# Patient Record
Sex: FEMALE
[De-identification: names, ages, dates, MRNs, and addresses within clinical notes are randomized per-mention and may not be internally consistent; named-entity substitution may affect disease eponyms.]

---

## 2020-07-13 PROBLEM — Z00.00 ENCOUNTER FOR PREVENTIVE HEALTH EXAMINATION: Status: ACTIVE | Noted: 2020-07-13

## 2020-07-21 ENCOUNTER — APPOINTMENT (OUTPATIENT)
Dept: NEUROLOGY | Facility: CLINIC | Age: 60
End: 2020-07-21
Payer: COMMERCIAL

## 2020-07-21 VITALS
DIASTOLIC BLOOD PRESSURE: 79 MMHG | BODY MASS INDEX: 26.05 KG/M2 | HEIGHT: 63 IN | TEMPERATURE: 97.7 F | HEART RATE: 107 BPM | OXYGEN SATURATION: 98 % | WEIGHT: 147 LBS | SYSTOLIC BLOOD PRESSURE: 126 MMHG

## 2020-07-21 DIAGNOSIS — I63.19: ICD-10-CM

## 2020-07-21 PROCEDURE — 99204 OFFICE O/P NEW MOD 45 MIN: CPT

## 2020-07-21 RX ORDER — ATORVASTATIN CALCIUM 40 MG/1
40 TABLET, FILM COATED ORAL
Qty: 30 | Refills: 5 | Status: ACTIVE | COMMUNITY
Start: 2020-07-21 | End: 1900-01-01

## 2020-07-21 NOTE — PHYSICAL EXAM
[FreeTextEntry1] : walks with walker\par left central facial\par bilateral pronator drift\par rue 4+, lue 4+\par rle - 4-, LLE - 4+\par

## 2020-07-21 NOTE — HISTORY OF PRESENT ILLNESS
[FreeTextEntry1] : difficulty walking since last december. Especially with balance. \par Last MRI done [id number] shows a left MCA CVAand and right and left pontine lesions with SWI susceptibility.\par Patient with MRI brain - right rain hemorrhagic lesion and chronic microvascular disease\par \par MRA head and neck is unremarkable without lvo or high grade stenosis or \par \par TTE and labs to be done this week

## 2020-07-21 NOTE — ASSESSMENT
[FreeTextEntry1] : Multiple strokes - \par 2 hemorrhagic and 2 ischemic(ischemic most recently) \par \par  cont atorvastatin - increase to 40 mg \par cont norvasc 2.5 mg\par \par follow up in 2  weeks with NP with labs and echo \par will consider adding JASMYNE and hypercoag work up.

## 2020-08-21 ENCOUNTER — APPOINTMENT (OUTPATIENT)
Dept: NEUROLOGY | Facility: CLINIC | Age: 60
End: 2020-08-21
Payer: SELF-PAY

## 2020-08-21 DIAGNOSIS — I63.412 CEREBRAL INFARCTION DUE TO EMBOLISM OF LEFT MIDDLE CEREBRAL ARTERY: ICD-10-CM

## 2020-08-21 DIAGNOSIS — R42 DIZZINESS AND GIDDINESS: ICD-10-CM

## 2020-08-21 PROCEDURE — 99214 OFFICE O/P EST MOD 30 MIN: CPT | Mod: 95

## 2020-08-24 PROBLEM — I63.412 CEREBROVASCULAR ACCIDENT (CVA) DUE TO EMBOLISM OF LEFT MIDDLE CEREBRAL ARTERY: Status: ACTIVE | Noted: 2020-07-21

## 2020-08-24 PROBLEM — R42 VERTIGO: Status: ACTIVE | Noted: 2020-08-21

## 2020-08-24 RX ORDER — ASPIRIN 81 MG/1
81 TABLET, CHEWABLE ORAL
Qty: 30 | Refills: 0 | Status: ACTIVE | COMMUNITY
Start: 2020-08-12

## 2020-08-24 RX ORDER — ALENDRONATE SODIUM 35 MG/1
35 TABLET ORAL
Qty: 4 | Refills: 0 | Status: ACTIVE | COMMUNITY
Start: 2020-08-18

## 2020-08-24 RX ORDER — ATORVASTATIN CALCIUM 10 MG/1
10 TABLET, FILM COATED ORAL
Qty: 30 | Refills: 0 | Status: ACTIVE | COMMUNITY
Start: 2020-06-08

## 2020-09-04 ENCOUNTER — LABORATORY RESULT (OUTPATIENT)
Age: 60
End: 2020-09-04

## 2020-09-09 LAB
AT III PPP CHRO-ACNC: 109 %
B2 GLYCOPROT1 AB SER QL: NEGATIVE
B2 GLYCOPROT1 IGA SERPL IA-ACNC: <5 SAU
B2 GLYCOPROT1 IGG SER-ACNC: <5 SGU
B2 GLYCOPROT1 IGM SER-ACNC: <5 SMU
CARDIOLIPIN IGM SER-MCNC: 6.6 MPL
CARDIOLIPIN IGM SER-MCNC: <5 GPL
CONFIRM: 31.7 SEC
DRVVT IMM 1:2 NP PPP: NORMAL
DRVVT SCREEN TO CONFIRM RATIO: 0.95 RATIO
HCYS SERPL-MCNC: 8.6 UMOL/L
PROT C PPP CHRO-ACNC: 93 %
PROT S AG ACT/NOR PPP IA: 64 %
SCREEN DRVVT: 33.5 SEC
VIT B12 SERPL-MCNC: 866 PG/ML

## 2020-09-10 LAB
METHYLMALONATE SERPL-SCNC: 100 NMOL/L
PROT S FREE PPP-ACNC: 83 % NORMAL

## 2020-09-11 LAB — DNA PLOIDY SPEC FC-IMP: NORMAL

## 2020-10-05 ENCOUNTER — APPOINTMENT (OUTPATIENT)
Dept: INTERNAL MEDICINE | Facility: CLINIC | Age: 60
End: 2020-10-05

## 2020-10-27 ENCOUNTER — APPOINTMENT (OUTPATIENT)
Dept: INTERNAL MEDICINE | Facility: CLINIC | Age: 60
End: 2020-10-27